# Patient Record
Sex: MALE | Race: WHITE
[De-identification: names, ages, dates, MRNs, and addresses within clinical notes are randomized per-mention and may not be internally consistent; named-entity substitution may affect disease eponyms.]

---

## 2018-10-21 ENCOUNTER — HOSPITAL ENCOUNTER (EMERGENCY)
Dept: HOSPITAL 94 - ER | Age: 83
Discharge: HOME | End: 2018-10-21
Payer: MEDICARE

## 2018-10-21 VITALS — WEIGHT: 231.49 LBS | HEIGHT: 68 IN | BODY MASS INDEX: 35.08 KG/M2

## 2018-10-21 VITALS — DIASTOLIC BLOOD PRESSURE: 65 MMHG | SYSTOLIC BLOOD PRESSURE: 133 MMHG
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
## 2018-10-21 DIAGNOSIS — Z98.890: ICD-10-CM

## 2018-10-21 DIAGNOSIS — Z60.2: ICD-10-CM

## 2018-10-21 DIAGNOSIS — M25.522: ICD-10-CM

## 2018-10-21 DIAGNOSIS — S90.415A: Primary | ICD-10-CM

## 2018-10-21 DIAGNOSIS — Z85.038: ICD-10-CM
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
## 2018-10-21 DIAGNOSIS — M79.632: ICD-10-CM

## 2018-10-21 DIAGNOSIS — R60.0: ICD-10-CM
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
## 2018-10-21 DIAGNOSIS — G89.29: ICD-10-CM

## 2018-10-21 PROCEDURE — 73080 X-RAY EXAM OF ELBOW: CPT

## 2018-10-21 PROCEDURE — 73660 X-RAY EXAM OF TOE(S): CPT

## 2018-10-21 PROCEDURE — 99284 EMERGENCY DEPT VISIT MOD MDM: CPT

## 2018-10-21 SDOH — SOCIAL STABILITY - SOCIAL INSECURITY: PROBLEMS RELATED TO LIVING ALONE: Z60.2

## 2022-02-25 ENCOUNTER — HOSPITAL ENCOUNTER (EMERGENCY)
Dept: HOSPITAL 94 - ER | Age: 87
Discharge: HOME | End: 2022-02-25
Payer: OTHER GOVERNMENT

## 2022-02-25 VITALS — HEIGHT: 68 IN | WEIGHT: 240.5 LBS | BODY MASS INDEX: 36.45 KG/M2

## 2022-02-25 VITALS — DIASTOLIC BLOOD PRESSURE: 74 MMHG | SYSTOLIC BLOOD PRESSURE: 120 MMHG

## 2022-02-25 DIAGNOSIS — Z72.89: ICD-10-CM

## 2022-02-25 DIAGNOSIS — R42: ICD-10-CM

## 2022-02-25 DIAGNOSIS — Z98.890: ICD-10-CM

## 2022-02-25 DIAGNOSIS — Z60.2: ICD-10-CM

## 2022-02-25 DIAGNOSIS — F10.129: Primary | ICD-10-CM

## 2022-02-25 DIAGNOSIS — Z85.038: ICD-10-CM
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
## 2022-02-25 DIAGNOSIS — R41.0: ICD-10-CM

## 2022-02-25 DIAGNOSIS — G89.29: ICD-10-CM

## 2022-02-25 LAB
ALBUMIN SERPL BCP-MCNC: 3.3 G/DL (ref 3.4–5)
ALBUMIN/GLOB SERPL: 1.1 {RATIO} (ref 1.1–1.5)
ALP SERPL-CCNC: 237 IU/L (ref 46–116)
ALT SERPL W P-5'-P-CCNC: 15 U/L (ref 12–78)
AMMONIA PLAS-SCNC: < 10 UMOL/L (ref 11–32)
AMPHETAMINES UR QL SCN: NEGATIVE
ANION GAP SERPL CALCULATED.3IONS-SCNC: 11 MMOL/L (ref 8–16)
AST SERPL W P-5'-P-CCNC: 23 U/L (ref 10–37)
BACTERIA URNS QL MICRO: (no result) /HPF
BARBITURATES UR QL SCN: NEGATIVE
BASOPHILS # BLD AUTO: 0 X10'3 (ref 0–0.2)
BASOPHILS NFR BLD AUTO: 0.7 % (ref 0–1)
BENZODIAZ UR QL SCN: NEGATIVE
BILIRUB SERPL-MCNC: 0.5 MG/DL (ref 0.1–1)
BUN SERPL-MCNC: 9 MG/DL (ref 7–18)
BUN/CREAT SERPL: 11.8 (ref 5.4–32)
BZE UR QL SCN: NEGATIVE
CALCIUM SERPL-MCNC: 8.3 MG/DL (ref 8.5–10.1)
CANNABINOIDS UR QL SCN: NEGATIVE
CHLORIDE SERPL-SCNC: 109 MMOL/L (ref 99–107)
CLARITY UR: CLEAR
CO2 SERPL-SCNC: 24.5 MMOL/L (ref 24–32)
COLOR UR: YELLOW
CREAT SERPL-MCNC: 0.76 MG/DL (ref 0.6–1.1)
DEPRECATED SQUAMOUS URNS QL MICRO: (no result) /LPF
EOSINOPHIL # BLD AUTO: 0.3 X10'3 (ref 0–0.9)
EOSINOPHIL NFR BLD AUTO: 4.1 % (ref 0–6)
ERYTHROCYTE [DISTWIDTH] IN BLOOD BY AUTOMATED COUNT: 15.8 % (ref 11.5–14.5)
ETHANOL SERPL-MCNC: 0.16 GM/DL (ref 0–0.01)
GFR SERPL CREATININE-BSD FRML MDRD: > 90 ML/MIN
GLUCOSE SERPL-MCNC: 93 MG/DL (ref 70–104)
GLUCOSE UR STRIP-MCNC: NEGATIVE MG/DL
HCT VFR BLD AUTO: 41.8 % (ref 42–52)
HGB BLD-MCNC: 13.8 G/DL (ref 14–17.9)
HGB UR QL STRIP: (no result)
KETONES UR STRIP-MCNC: NEGATIVE MG/DL
LACTATE PLASV-SCNC: 2 MMOL/L (ref 0.4–2)
LEUKOCYTE ESTERASE UR QL STRIP: NEGATIVE
LYMPHOCYTES # BLD AUTO: 2.9 X10'3 (ref 1.1–4.8)
LYMPHOCYTES NFR BLD AUTO: 43.4 % (ref 21–51)
MCH RBC QN AUTO: 31.5 PG (ref 27–31)
MCHC RBC AUTO-ENTMCNC: 33 G/DL (ref 33–36.5)
MCV RBC AUTO: 95.4 FL (ref 78–98)
METHADONE UR QL SCN: NEGATIVE
MONOCYTES # BLD AUTO: 0.5 X10'3 (ref 0–0.9)
MONOCYTES NFR BLD AUTO: 7.8 % (ref 2–12)
MUCOUS THREADS URNS QL MICRO: (no result) /LPF
NEUTROPHILS # BLD AUTO: 3 X10'3 (ref 1.8–7.7)
NEUTROPHILS NFR BLD AUTO: 44 % (ref 42–75)
NITRITE UR QL STRIP: NEGATIVE
OPIATES UR QL SCN: NEGATIVE
PCP UR QL SCN: NEGATIVE
PH UR STRIP: 6 [PH] (ref 4.8–8)
PLATELET # BLD AUTO: 151 X10'3 (ref 140–440)
PMV BLD AUTO: 9.1 FL (ref 7.4–10.4)
POTASSIUM SERPL-SCNC: 3.5 MMOL/L (ref 3.5–5.1)
PROT SERPL-MCNC: 6.2 G/DL (ref 6.4–8.2)
PROT UR QL STRIP: NEGATIVE MG/DL
RBC # BLD AUTO: 4.38 X10'6 (ref 4.7–6.1)
RBC #/AREA URNS HPF: (no result) /HPF (ref 0–2)
SODIUM SERPL-SCNC: 144 MMOL/L (ref 135–145)
SP GR UR STRIP: <=1.005 (ref 1–1.03)
URN COLLECT METHOD CLASS: (no result)
UROBILINOGEN UR STRIP-MCNC: 0.2 E.U/DL (ref 0.2–1)
WBC # BLD AUTO: 6.7 X10'3 (ref 4.5–11)
WBC #/AREA URNS HPF: (no result) /HPF (ref 0–4)

## 2022-02-25 PROCEDURE — 80320 DRUG SCREEN QUANTALCOHOLS: CPT

## 2022-02-25 PROCEDURE — 83605 ASSAY OF LACTIC ACID: CPT

## 2022-02-25 PROCEDURE — 71045 X-RAY EXAM CHEST 1 VIEW: CPT

## 2022-02-25 PROCEDURE — 87040 BLOOD CULTURE FOR BACTERIA: CPT

## 2022-02-25 PROCEDURE — 82140 ASSAY OF AMMONIA: CPT

## 2022-02-25 PROCEDURE — 80305 DRUG TEST PRSMV DIR OPT OBS: CPT

## 2022-02-25 PROCEDURE — 84484 ASSAY OF TROPONIN QUANT: CPT

## 2022-02-25 PROCEDURE — 80053 COMPREHEN METABOLIC PANEL: CPT

## 2022-02-25 PROCEDURE — 84443 ASSAY THYROID STIM HORMONE: CPT

## 2022-02-25 PROCEDURE — 85025 COMPLETE CBC W/AUTO DIFF WBC: CPT

## 2022-02-25 PROCEDURE — 99285 EMERGENCY DEPT VISIT HI MDM: CPT

## 2022-02-25 PROCEDURE — 81001 URINALYSIS AUTO W/SCOPE: CPT

## 2022-02-25 PROCEDURE — 36415 COLL VENOUS BLD VENIPUNCTURE: CPT

## 2022-02-25 PROCEDURE — 70450 CT HEAD/BRAIN W/O DYE: CPT

## 2022-02-25 PROCEDURE — 93005 ELECTROCARDIOGRAM TRACING: CPT

## 2022-02-25 SDOH — SOCIAL STABILITY - SOCIAL INSECURITY: PROBLEMS RELATED TO LIVING ALONE: Z60.2

## 2022-04-21 ENCOUNTER — HOSPITAL ENCOUNTER (EMERGENCY)
Dept: HOSPITAL 94 - ER | Age: 87
Discharge: HOME | End: 2022-04-21
Payer: OTHER GOVERNMENT

## 2022-04-21 VITALS — BODY MASS INDEX: 36.45 KG/M2 | WEIGHT: 240.5 LBS | HEIGHT: 68 IN

## 2022-04-21 VITALS — DIASTOLIC BLOOD PRESSURE: 93 MMHG | SYSTOLIC BLOOD PRESSURE: 154 MMHG

## 2022-04-21 DIAGNOSIS — R11.0: Primary | ICD-10-CM

## 2022-04-21 DIAGNOSIS — Z86.73: ICD-10-CM

## 2022-04-21 DIAGNOSIS — Z60.2: ICD-10-CM

## 2022-04-21 DIAGNOSIS — I48.91: ICD-10-CM

## 2022-04-21 DIAGNOSIS — Z72.89: ICD-10-CM

## 2022-04-21 DIAGNOSIS — Z98.890: ICD-10-CM

## 2022-04-21 DIAGNOSIS — Z85.038: ICD-10-CM

## 2022-04-21 DIAGNOSIS — I10: ICD-10-CM

## 2022-04-21 DIAGNOSIS — G89.29: ICD-10-CM

## 2022-04-21 LAB
ALBUMIN SERPL BCP-MCNC: 3.7 G/DL (ref 3.4–5)
ALBUMIN/GLOB SERPL: 1.4 {RATIO} (ref 1.1–1.5)
ALP SERPL-CCNC: 232 IU/L (ref 46–116)
ALT SERPL W P-5'-P-CCNC: 15 U/L (ref 12–78)
ANION GAP SERPL CALCULATED.3IONS-SCNC: 10 MMOL/L (ref 8–16)
AST SERPL W P-5'-P-CCNC: 26 U/L (ref 10–37)
BASOPHILS # BLD AUTO: 0 X10'3 (ref 0–0.2)
BASOPHILS NFR BLD AUTO: 0.5 % (ref 0–1)
BILIRUB SERPL-MCNC: 0.9 MG/DL (ref 0.1–1)
BUN SERPL-MCNC: 17 MG/DL (ref 7–18)
BUN/CREAT SERPL: 18.3 (ref 5.4–32)
CALCIUM SERPL-MCNC: 8.7 MG/DL (ref 8.5–10.1)
CHLORIDE SERPL-SCNC: 105 MMOL/L (ref 99–107)
CO2 SERPL-SCNC: 24.2 MMOL/L (ref 24–32)
CREAT SERPL-MCNC: 0.93 MG/DL (ref 0.6–1.1)
EOSINOPHIL # BLD AUTO: 0.1 X10'3 (ref 0–0.9)
EOSINOPHIL NFR BLD AUTO: 1.2 % (ref 0–6)
ERYTHROCYTE [DISTWIDTH] IN BLOOD BY AUTOMATED COUNT: 14.5 % (ref 11.5–14.5)
GFR SERPL CREATININE-BSD FRML MDRD: 77 ML/MIN
GLUCOSE SERPL-MCNC: 103 MG/DL (ref 70–104)
HCT VFR BLD AUTO: 38.3 % (ref 42–52)
HGB BLD-MCNC: 12.7 G/DL (ref 14–17.9)
LYMPHOCYTES # BLD AUTO: 1.1 X10'3 (ref 1.1–4.8)
LYMPHOCYTES NFR BLD AUTO: 15.1 % (ref 21–51)
MCH RBC QN AUTO: 31.4 PG (ref 27–31)
MCHC RBC AUTO-ENTMCNC: 33.1 G/DL (ref 33–36.5)
MCV RBC AUTO: 94.7 FL (ref 78–98)
MONOCYTES # BLD AUTO: 0.5 X10'3 (ref 0–0.9)
MONOCYTES NFR BLD AUTO: 7 % (ref 2–12)
NEUTROPHILS # BLD AUTO: 5.5 X10'3 (ref 1.8–7.7)
NEUTROPHILS NFR BLD AUTO: 76.2 % (ref 42–75)
PLATELET # BLD AUTO: 131 X10'3 (ref 140–440)
PMV BLD AUTO: 8.7 FL (ref 7.4–10.4)
POTASSIUM SERPL-SCNC: 3.9 MMOL/L (ref 3.5–5.1)
PROT SERPL-MCNC: 6.3 G/DL (ref 6.4–8.2)
RBC # BLD AUTO: 4.05 X10'6 (ref 4.7–6.1)
SODIUM SERPL-SCNC: 139 MMOL/L (ref 135–145)
WBC # BLD AUTO: 7.2 X10'3 (ref 4.5–11)

## 2022-04-21 PROCEDURE — 93005 ELECTROCARDIOGRAM TRACING: CPT

## 2022-04-21 PROCEDURE — 36415 COLL VENOUS BLD VENIPUNCTURE: CPT

## 2022-04-21 PROCEDURE — 84484 ASSAY OF TROPONIN QUANT: CPT

## 2022-04-21 PROCEDURE — 85025 COMPLETE CBC W/AUTO DIFF WBC: CPT

## 2022-04-21 PROCEDURE — 80053 COMPREHEN METABOLIC PANEL: CPT

## 2022-04-21 PROCEDURE — 71045 X-RAY EXAM CHEST 1 VIEW: CPT

## 2022-04-21 PROCEDURE — 99285 EMERGENCY DEPT VISIT HI MDM: CPT

## 2022-04-21 PROCEDURE — 83880 ASSAY OF NATRIURETIC PEPTIDE: CPT

## 2022-04-21 SDOH — SOCIAL STABILITY - SOCIAL INSECURITY: PROBLEMS RELATED TO LIVING ALONE: Z60.2

## 2022-05-28 ENCOUNTER — HOSPITAL ENCOUNTER (INPATIENT)
Dept: HOSPITAL 94 - ER | Age: 87
LOS: 2 days | Discharge: HOME | DRG: 69 | End: 2022-05-30
Attending: INTERNAL MEDICINE | Admitting: INTERNAL MEDICINE
Payer: OTHER GOVERNMENT

## 2022-05-28 VITALS — BODY MASS INDEX: 38.25 KG/M2 | WEIGHT: 267.2 LBS | HEIGHT: 70 IN

## 2022-05-28 VITALS — SYSTOLIC BLOOD PRESSURE: 183 MMHG | DIASTOLIC BLOOD PRESSURE: 83 MMHG
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
## 2022-05-28 DIAGNOSIS — G45.0: Primary | ICD-10-CM

## 2022-05-28 DIAGNOSIS — I48.91: ICD-10-CM
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
## 2022-05-28 DIAGNOSIS — G62.9: ICD-10-CM

## 2022-05-28 DIAGNOSIS — Z86.73: ICD-10-CM

## 2022-05-28 DIAGNOSIS — I10: ICD-10-CM

## 2022-05-28 DIAGNOSIS — G89.29: ICD-10-CM
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
## 2022-05-28 DIAGNOSIS — Z60.2: ICD-10-CM

## 2022-05-28 DIAGNOSIS — I25.10: ICD-10-CM

## 2022-05-28 DIAGNOSIS — G70.00: ICD-10-CM

## 2022-05-28 DIAGNOSIS — Z22.322: ICD-10-CM

## 2022-05-28 DIAGNOSIS — Z85.038: ICD-10-CM

## 2022-05-28 DIAGNOSIS — N40.0: ICD-10-CM

## 2022-05-28 LAB
ALBUMIN SERPL BCP-MCNC: 3.6 G/DL (ref 3.4–5)
ALBUMIN/GLOB SERPL: 1.2 {RATIO} (ref 1.1–1.5)
ALP SERPL-CCNC: 217 IU/L (ref 46–116)
ALT SERPL W P-5'-P-CCNC: 14 U/L (ref 12–78)
ANION GAP SERPL CALCULATED.3IONS-SCNC: 7 MMOL/L (ref 8–16)
APTT PPP: 34 SECONDS (ref 22–32)
AST SERPL W P-5'-P-CCNC: 27 U/L (ref 10–37)
BASOPHILS # BLD AUTO: 0 X10'3 (ref 0–0.2)
BASOPHILS NFR BLD AUTO: 0.4 % (ref 0–1)
BILIRUB SERPL-MCNC: 0.9 MG/DL (ref 0.1–1)
BUN SERPL-MCNC: 11 MG/DL (ref 7–18)
BUN/CREAT SERPL: 12.2 (ref 5.4–32)
CALCIUM SERPL-MCNC: 8.3 MG/DL (ref 8.5–10.1)
CHLORIDE SERPL-SCNC: 108 MMOL/L (ref 99–107)
CHOLEST SERPL-MCNC: 162 MG/DL (ref 0–200)
CHOLEST/HDLC SERPL: 2.1 {RATIO} (ref 0–4.99)
CO2 SERPL-SCNC: 26.8 MMOL/L (ref 24–32)
CREAT SERPL-MCNC: 0.9 MG/DL (ref 0.6–1.1)
EOSINOPHIL # BLD AUTO: 0.2 X10'3 (ref 0–0.9)
EOSINOPHIL NFR BLD AUTO: 3.4 % (ref 0–6)
ERYTHROCYTE [DISTWIDTH] IN BLOOD BY AUTOMATED COUNT: 14.7 % (ref 11.5–14.5)
GFR SERPL CREATININE-BSD FRML MDRD: 80 ML/MIN
GLUCOSE SERPL-MCNC: 108 MG/DL (ref 70–104)
HBA1C MFR BLD: 5.3 % (ref 4.5–6.2)
HCT VFR BLD AUTO: 41.7 % (ref 42–52)
HDLC SERPL-MCNC: 79 MG/DL (ref 35–60)
HGB BLD-MCNC: 13.6 G/DL (ref 14–17.9)
LDLC SERPL DIRECT ASSAY-MCNC: 64 MG/DL (ref 50–100)
LYMPHOCYTES # BLD AUTO: 1.9 X10'3 (ref 1.1–4.8)
LYMPHOCYTES NFR BLD AUTO: 26.6 % (ref 21–51)
MAGNESIUM SERPL-MCNC: 2.1 MG/DL (ref 1.5–2.4)
MCH RBC QN AUTO: 31.3 PG (ref 27–31)
MCHC RBC AUTO-ENTMCNC: 32.7 G/DL (ref 33–36.5)
MCV RBC AUTO: 95.6 FL (ref 78–98)
MONOCYTES # BLD AUTO: 0.5 X10'3 (ref 0–0.9)
MONOCYTES NFR BLD AUTO: 7.1 % (ref 2–12)
NEUTROPHILS # BLD AUTO: 4.4 X10'3 (ref 1.8–7.7)
NEUTROPHILS NFR BLD AUTO: 62.5 % (ref 42–75)
PLATELET # BLD AUTO: 148 X10'3 (ref 140–440)
PMV BLD AUTO: 8.9 FL (ref 7.4–10.4)
POTASSIUM SERPL-SCNC: 3.9 MMOL/L (ref 3.5–5.1)
PROT SERPL-MCNC: 6.5 G/DL (ref 6.4–8.2)
RBC # BLD AUTO: 4.36 X10'6 (ref 4.7–6.1)
SODIUM SERPL-SCNC: 142 MMOL/L (ref 135–145)
TRIGL SERPL-MCNC: 109 MG/DL (ref 20–135)
WBC # BLD AUTO: 7 X10'3 (ref 4.5–11)

## 2022-05-28 PROCEDURE — 93306 TTE W/DOPPLER COMPLETE: CPT

## 2022-05-28 PROCEDURE — 97116 GAIT TRAINING THERAPY: CPT

## 2022-05-28 PROCEDURE — 87081 CULTURE SCREEN ONLY: CPT

## 2022-05-28 PROCEDURE — 80053 COMPREHEN METABOLIC PANEL: CPT

## 2022-05-28 PROCEDURE — B3281ZZ COMPUTERIZED TOMOGRAPHY (CT SCAN) OF BILATERAL INTERNAL CAROTID ARTERIES USING LOW OSMOLAR CONTRAST: ICD-10-PCS

## 2022-05-28 PROCEDURE — 71045 X-RAY EXAM CHEST 1 VIEW: CPT

## 2022-05-28 PROCEDURE — 99291 CRITICAL CARE FIRST HOUR: CPT

## 2022-05-28 PROCEDURE — 80048 BASIC METABOLIC PNL TOTAL CA: CPT

## 2022-05-28 PROCEDURE — 80061 LIPID PANEL: CPT

## 2022-05-28 PROCEDURE — 83735 ASSAY OF MAGNESIUM: CPT

## 2022-05-28 PROCEDURE — 36415 COLL VENOUS BLD VENIPUNCTURE: CPT

## 2022-05-28 PROCEDURE — B32G1ZZ COMPUTERIZED TOMOGRAPHY (CT SCAN) OF BILATERAL VERTEBRAL ARTERIES USING LOW OSMOLAR CONTRAST: ICD-10-PCS

## 2022-05-28 PROCEDURE — 83036 HEMOGLOBIN GLYCOSYLATED A1C: CPT

## 2022-05-28 PROCEDURE — 93880 EXTRACRANIAL BILAT STUDY: CPT

## 2022-05-28 PROCEDURE — B32R1ZZ COMPUTERIZED TOMOGRAPHY (CT SCAN) OF INTRACRANIAL ARTERIES USING LOW OSMOLAR CONTRAST: ICD-10-PCS

## 2022-05-28 PROCEDURE — 85730 THROMBOPLASTIN TIME PARTIAL: CPT

## 2022-05-28 PROCEDURE — B3251ZZ COMPUTERIZED TOMOGRAPHY (CT SCAN) OF BILATERAL COMMON CAROTID ARTERIES USING LOW OSMOLAR CONTRAST: ICD-10-PCS

## 2022-05-28 PROCEDURE — 85025 COMPLETE CBC W/AUTO DIFF WBC: CPT

## 2022-05-28 PROCEDURE — 70450 CT HEAD/BRAIN W/O DYE: CPT

## 2022-05-28 PROCEDURE — 97161 PT EVAL LOW COMPLEX 20 MIN: CPT

## 2022-05-28 PROCEDURE — 85610 PROTHROMBIN TIME: CPT

## 2022-05-28 PROCEDURE — 73630 X-RAY EXAM OF FOOT: CPT

## 2022-05-28 RX ADMIN — OXYCODONE PRN MG: 5 TABLET ORAL at 22:40

## 2022-05-28 RX ADMIN — Medication SCH TAB: at 17:10

## 2022-05-28 SDOH — SOCIAL STABILITY - SOCIAL INSECURITY: PROBLEMS RELATED TO LIVING ALONE: Z60.2

## 2022-05-28 NOTE — NUR
CALLED DR SAINI CONCERNING A BRUISED TOE ON PATIENT'S LEFT TOE. PT BELIEVES 
HE "PROBABLY BROKE IT TWO DAYS AGO". REQUESTED IMAGING. DR VALERY CORNELIUS'D XRAY OF 
LEFT FOOT. 



ALSO SPOKE TO HIM CONCERNING A DIET ORDER FOR PT. PT PASSED A SWALLOW TEST BY 
STROKE RN. GIVEN A TELEPHONE ORDER FOR A HEART HEALTHY DIET.

## 2022-05-29 VITALS — DIASTOLIC BLOOD PRESSURE: 71 MMHG | SYSTOLIC BLOOD PRESSURE: 119 MMHG

## 2022-05-29 VITALS — SYSTOLIC BLOOD PRESSURE: 166 MMHG | DIASTOLIC BLOOD PRESSURE: 83 MMHG

## 2022-05-29 VITALS — DIASTOLIC BLOOD PRESSURE: 91 MMHG | SYSTOLIC BLOOD PRESSURE: 134 MMHG

## 2022-05-29 VITALS — DIASTOLIC BLOOD PRESSURE: 70 MMHG | SYSTOLIC BLOOD PRESSURE: 178 MMHG

## 2022-05-29 VITALS — DIASTOLIC BLOOD PRESSURE: 74 MMHG | SYSTOLIC BLOOD PRESSURE: 142 MMHG

## 2022-05-29 VITALS — DIASTOLIC BLOOD PRESSURE: 65 MMHG | SYSTOLIC BLOOD PRESSURE: 131 MMHG

## 2022-05-29 VITALS — DIASTOLIC BLOOD PRESSURE: 69 MMHG | SYSTOLIC BLOOD PRESSURE: 148 MMHG

## 2022-05-29 LAB
ALBUMIN SERPL BCP-MCNC: 3.6 G/DL (ref 3.4–5)
ANION GAP SERPL CALCULATED.3IONS-SCNC: 6 MMOL/L (ref 8–16)
BASOPHILS # BLD AUTO: 0 X10'3 (ref 0–0.2)
BASOPHILS NFR BLD AUTO: 0.5 % (ref 0–1)
BUN SERPL-MCNC: 9 MG/DL (ref 7–18)
BUN/CREAT SERPL: 11.7 (ref 5.4–32)
CALCIUM SERPL-MCNC: 8.7 MG/DL (ref 8.5–10.1)
CHLORIDE SERPL-SCNC: 110 MMOL/L (ref 99–107)
CO2 SERPL-SCNC: 25.9 MMOL/L (ref 24–32)
CREAT SERPL-MCNC: 0.77 MG/DL (ref 0.6–1.1)
EOSINOPHIL # BLD AUTO: 0.2 X10'3 (ref 0–0.9)
EOSINOPHIL NFR BLD AUTO: 3.4 % (ref 0–6)
ERYTHROCYTE [DISTWIDTH] IN BLOOD BY AUTOMATED COUNT: 14.6 % (ref 11.5–14.5)
GFR SERPL CREATININE-BSD FRML MDRD: > 90 ML/MIN
GLUCOSE SERPL-MCNC: 83 MG/DL (ref 70–104)
HCT VFR BLD AUTO: 41.2 % (ref 42–52)
HGB BLD-MCNC: 13.7 G/DL (ref 14–17.9)
LYMPHOCYTES # BLD AUTO: 1.2 X10'3 (ref 1.1–4.8)
LYMPHOCYTES NFR BLD AUTO: 21 % (ref 21–51)
MAGNESIUM SERPL-MCNC: 2.2 MG/DL (ref 1.5–2.4)
MCH RBC QN AUTO: 32 PG (ref 27–31)
MCHC RBC AUTO-ENTMCNC: 33.3 G/DL (ref 33–36.5)
MCV RBC AUTO: 96.1 FL (ref 78–98)
MONOCYTES # BLD AUTO: 0.4 X10'3 (ref 0–0.9)
MONOCYTES NFR BLD AUTO: 7.3 % (ref 2–12)
NEUTROPHILS # BLD AUTO: 4 X10'3 (ref 1.8–7.7)
NEUTROPHILS NFR BLD AUTO: 67.8 % (ref 42–75)
PLATELET # BLD AUTO: 136 X10'3 (ref 140–440)
PMV BLD AUTO: 8.9 FL (ref 7.4–10.4)
POTASSIUM SERPL-SCNC: 4 MMOL/L (ref 3.5–5.1)
RBC # BLD AUTO: 4.29 X10'6 (ref 4.7–6.1)
SODIUM SERPL-SCNC: 142 MMOL/L (ref 135–145)
WBC # BLD AUTO: 5.9 X10'3 (ref 4.5–11)

## 2022-05-29 RX ADMIN — OXYCODONE PRN MG: 5 TABLET ORAL at 06:09

## 2022-05-29 RX ADMIN — OXYCODONE PRN MG: 5 TABLET ORAL at 19:21

## 2022-05-29 RX ADMIN — OXYCODONE PRN MG: 5 TABLET ORAL at 19:25

## 2022-05-29 RX ADMIN — Medication SCH TAB: at 10:05

## 2022-05-29 NOTE — NUR
Patient in room PCU 3026. I have received report from Shari BAILON and had the opportunity to 
ask questions and assume patient care.

## 2022-05-29 NOTE — NUR
Tried calling [facility] where patient resides to obtain info on pacemaker. Transferred from SNF to 
Assisted living without anyone assisting me. Will pass on need for Pacemaker make and model 
information to NOC RN.

## 2022-05-29 NOTE — NUR
Problems reprioritized. Patient report given, questions answered & plan of care reviewed 
with Patricia BAILON.

## 2022-05-30 VITALS — DIASTOLIC BLOOD PRESSURE: 71 MMHG | SYSTOLIC BLOOD PRESSURE: 154 MMHG

## 2022-05-30 VITALS — SYSTOLIC BLOOD PRESSURE: 139 MMHG | DIASTOLIC BLOOD PRESSURE: 85 MMHG

## 2022-05-30 LAB
ALBUMIN SERPL BCP-MCNC: 3.2 G/DL (ref 3.4–5)
ANION GAP SERPL CALCULATED.3IONS-SCNC: 8 MMOL/L (ref 8–16)
BASOPHILS # BLD AUTO: 0 X10'3 (ref 0–0.2)
BASOPHILS NFR BLD AUTO: 0.5 % (ref 0–1)
BUN SERPL-MCNC: 11 MG/DL (ref 7–18)
BUN/CREAT SERPL: 15.9 (ref 5.4–32)
CALCIUM SERPL-MCNC: 8.7 MG/DL (ref 8.5–10.1)
CHLORIDE SERPL-SCNC: 108 MMOL/L (ref 99–107)
CO2 SERPL-SCNC: 26.5 MMOL/L (ref 24–32)
CREAT SERPL-MCNC: 0.69 MG/DL (ref 0.6–1.1)
EOSINOPHIL # BLD AUTO: 0.2 X10'3 (ref 0–0.9)
EOSINOPHIL NFR BLD AUTO: 3.2 % (ref 0–6)
ERYTHROCYTE [DISTWIDTH] IN BLOOD BY AUTOMATED COUNT: 14.5 % (ref 11.5–14.5)
GFR SERPL CREATININE-BSD FRML MDRD: > 90 ML/MIN
GLUCOSE SERPL-MCNC: 101 MG/DL (ref 70–104)
HCT VFR BLD AUTO: 38.1 % (ref 42–52)
HGB BLD-MCNC: 12.7 G/DL (ref 14–17.9)
LYMPHOCYTES # BLD AUTO: 1.2 X10'3 (ref 1.1–4.8)
LYMPHOCYTES NFR BLD AUTO: 21.6 % (ref 21–51)
MAGNESIUM SERPL-MCNC: 2.1 MG/DL (ref 1.5–2.4)
MCH RBC QN AUTO: 31.7 PG (ref 27–31)
MCHC RBC AUTO-ENTMCNC: 33.3 G/DL (ref 33–36.5)
MCV RBC AUTO: 95.1 FL (ref 78–98)
MONOCYTES # BLD AUTO: 0.5 X10'3 (ref 0–0.9)
MONOCYTES NFR BLD AUTO: 8.8 % (ref 2–12)
NEUTROPHILS # BLD AUTO: 3.6 X10'3 (ref 1.8–7.7)
NEUTROPHILS NFR BLD AUTO: 65.9 % (ref 42–75)
PLATELET # BLD AUTO: 127 X10'3 (ref 140–440)
PMV BLD AUTO: 9.1 FL (ref 7.4–10.4)
POTASSIUM SERPL-SCNC: 3.5 MMOL/L (ref 3.5–5.1)
RBC # BLD AUTO: 4.01 X10'6 (ref 4.7–6.1)
SODIUM SERPL-SCNC: 142 MMOL/L (ref 135–145)
WBC # BLD AUTO: 5.4 X10'3 (ref 4.5–11)

## 2022-05-30 RX ADMIN — OXYCODONE PRN MG: 5 TABLET ORAL at 01:29

## 2022-05-30 RX ADMIN — Medication SCH TAB: at 07:31

## 2022-05-30 RX ADMIN — OXYCODONE PRN MG: 5 TABLET ORAL at 07:31

## 2022-05-30 NOTE — NUR
Patient in room PCU 3026. I have received report from   Patricia BAILON and had the opportunity 
to ask questions and assume patient care.

## 2022-05-30 NOTE — NUR
patient up and about in room with walker. very anxious to go home as stated by patient. Dr Behl into see patient. [facility] is to [facility]. [facility] DC instructions given to patient 
and son Heather. No new meds but meds clarified with patient who states he has them all 
organized at home. Patient DC home via private  car with son heather in stable condition.

## 2022-05-30 NOTE — NUR
ASSISTING RN WITH MED REC, CALL [facility] FOR UPDATED MED REC, THEY WILL FAX TO 
PCU, RN AWARE

## 2022-05-30 NOTE — NUR
Problems reprioritized. Patient report given, questions answered & plan of care reviewed 
with ADAMARIS Pruitt.

## 2024-08-10 ENCOUNTER — HOSPITAL ENCOUNTER (INPATIENT)
Dept: HOSPITAL 94 - ER | Age: 89
LOS: 2 days | Discharge: HOME | DRG: 291 | End: 2024-08-12
Attending: INTERNAL MEDICINE | Admitting: INTERNAL MEDICINE
Payer: OTHER GOVERNMENT

## 2024-08-10 VITALS
TEMPERATURE: 97.3 F | HEART RATE: 80 BPM | OXYGEN SATURATION: 95 % | DIASTOLIC BLOOD PRESSURE: 81 MMHG | SYSTOLIC BLOOD PRESSURE: 144 MMHG

## 2024-08-10 VITALS — WEIGHT: 271.17 LBS | BODY MASS INDEX: 41.1 KG/M2 | HEIGHT: 68 IN

## 2024-08-10 DIAGNOSIS — G89.29: ICD-10-CM

## 2024-08-10 DIAGNOSIS — I87.2: ICD-10-CM

## 2024-08-10 DIAGNOSIS — I25.10: ICD-10-CM

## 2024-08-10 DIAGNOSIS — M54.9: ICD-10-CM

## 2024-08-10 DIAGNOSIS — R39.15: ICD-10-CM

## 2024-08-10 DIAGNOSIS — L03.116: ICD-10-CM

## 2024-08-10 DIAGNOSIS — Z95.0: ICD-10-CM

## 2024-08-10 DIAGNOSIS — Z85.038: ICD-10-CM

## 2024-08-10 DIAGNOSIS — Z66: ICD-10-CM

## 2024-08-10 DIAGNOSIS — I48.91: ICD-10-CM

## 2024-08-10 DIAGNOSIS — Z86.73: ICD-10-CM

## 2024-08-10 DIAGNOSIS — Z79.899: ICD-10-CM

## 2024-08-10 DIAGNOSIS — G70.00: ICD-10-CM

## 2024-08-10 DIAGNOSIS — G62.9: ICD-10-CM

## 2024-08-10 DIAGNOSIS — I11.0: Primary | ICD-10-CM

## 2024-08-10 DIAGNOSIS — N40.1: ICD-10-CM

## 2024-08-10 DIAGNOSIS — I50.23: ICD-10-CM

## 2024-08-10 LAB
ALBUMIN SERPL BCP-MCNC: 3.2 G/DL (ref 3.4–5)
ANION GAP SERPL CALCULATED.3IONS-SCNC: 4 MMOL/L (ref 8–16)
BASOPHILS # BLD AUTO: 0 X10'3 (ref 0–0.2)
BASOPHILS NFR BLD AUTO: 0.6 % (ref 0–1)
BUN SERPL-MCNC: 17 MG/DL (ref 7–18)
BUN/CREAT SERPL: 19.3 (ref 10–20)
CALCIUM SERPL-MCNC: 8.8 MG/DL (ref 8.5–10.1)
CHLORIDE SERPL-SCNC: 109 MMOL/L (ref 99–107)
CO2 SERPL-SCNC: 28.4 MMOL/L (ref 24–32)
CREAT CL PREDICTED SERPL C-G-VRATE: 56 ML/MIN
CREAT SERPL-MCNC: 0.88 MG/DL (ref 0.6–1.1)
EOSINOPHIL # BLD AUTO: 0.1 X10'3 (ref 0–0.9)
EOSINOPHIL NFR BLD AUTO: 1.7 % (ref 0–6)
ERYTHROCYTE [DISTWIDTH] IN BLOOD BY AUTOMATED COUNT: 15 % (ref 11.5–14.5)
GFR SERPL CREATININE-BSD FRML MDRD: 82 ML/MIN
GLUCOSE SERPL-MCNC: 96 MG/DL (ref 70–104)
HCT VFR BLD AUTO: 39 % (ref 42–52)
HGB BLD-MCNC: 12.8 G/DL (ref 14–17.9)
LYMPHOCYTES # BLD AUTO: 1.2 X10'3 (ref 1.1–4.8)
LYMPHOCYTES NFR BLD AUTO: 22.6 % (ref 21–51)
MCH RBC QN AUTO: 31.8 PG (ref 27–31)
MCHC RBC AUTO-ENTMCNC: 32.8 G/DL (ref 33–36.5)
MCV RBC AUTO: 96.8 FL (ref 78–98)
MONOCYTES # BLD AUTO: 0.6 X10'3 (ref 0–0.9)
MONOCYTES NFR BLD AUTO: 10.9 % (ref 2–12)
NEUTROPHILS # BLD AUTO: 3.4 X10'3 (ref 1.8–7.7)
NEUTROPHILS NFR BLD AUTO: 64.2 % (ref 42–75)
NT-PROBNP SERPL-MCNC: 745 PG/ML (ref 0–450)
PLATELET # BLD AUTO: 139 X10'3 (ref 140–440)
PMV BLD AUTO: 8.9 FL (ref 7.4–10.4)
POTASSIUM SERPL-SCNC: 4.1 MMOL/L (ref 3.5–5.1)
RBC # BLD AUTO: 4.03 X10'6 (ref 4.7–6.1)
SODIUM SERPL-SCNC: 141 MMOL/L (ref 135–145)
WBC # BLD AUTO: 5.2 X10'3 (ref 4.5–11)

## 2024-08-10 PROCEDURE — 97161 PT EVAL LOW COMPLEX 20 MIN: CPT

## 2024-08-10 PROCEDURE — 93306 TTE W/DOPPLER COMPLETE: CPT

## 2024-08-10 PROCEDURE — 71045 X-RAY EXAM CHEST 1 VIEW: CPT

## 2024-08-10 PROCEDURE — 36415 COLL VENOUS BLD VENIPUNCTURE: CPT

## 2024-08-10 PROCEDURE — 99285 EMERGENCY DEPT VISIT HI MDM: CPT

## 2024-08-10 PROCEDURE — 85007 BL SMEAR W/DIFF WBC COUNT: CPT

## 2024-08-10 PROCEDURE — 93970 EXTREMITY STUDY: CPT

## 2024-08-10 PROCEDURE — 84484 ASSAY OF TROPONIN QUANT: CPT

## 2024-08-10 PROCEDURE — 83880 ASSAY OF NATRIURETIC PEPTIDE: CPT

## 2024-08-10 PROCEDURE — 97530 THERAPEUTIC ACTIVITIES: CPT

## 2024-08-10 PROCEDURE — 80048 BASIC METABOLIC PNL TOTAL CA: CPT

## 2024-08-10 PROCEDURE — 85025 COMPLETE CBC W/AUTO DIFF WBC: CPT

## 2024-08-10 PROCEDURE — 85610 PROTHROMBIN TIME: CPT

## 2024-08-10 PROCEDURE — 80053 COMPREHEN METABOLIC PANEL: CPT

## 2024-08-10 PROCEDURE — 93005 ELECTROCARDIOGRAM TRACING: CPT

## 2024-08-10 PROCEDURE — 97116 GAIT TRAINING THERAPY: CPT

## 2024-08-10 PROCEDURE — 87081 CULTURE SCREEN ONLY: CPT

## 2024-08-10 RX ADMIN — SACUBITRIL AND VALSARTAN SCH TABLET: 24; 26 TABLET, FILM COATED ORAL at 21:47

## 2024-08-10 RX ADMIN — HEPARIN SODIUM SCH UNIT: 5000 INJECTION, SOLUTION INTRAVENOUS; SUBCUTANEOUS at 21:46

## 2024-08-10 RX ADMIN — HYDROCODONE BITARTRATE AND ACETAMINOPHEN PRN TAB: 5; 325 TABLET ORAL at 17:45

## 2024-08-11 VITALS
HEART RATE: 79 BPM | SYSTOLIC BLOOD PRESSURE: 155 MMHG | RESPIRATION RATE: 18 BRPM | TEMPERATURE: 98 F | OXYGEN SATURATION: 95 % | DIASTOLIC BLOOD PRESSURE: 86 MMHG

## 2024-08-11 VITALS
DIASTOLIC BLOOD PRESSURE: 81 MMHG | HEART RATE: 73 BPM | OXYGEN SATURATION: 98 % | RESPIRATION RATE: 16 BRPM | TEMPERATURE: 98.7 F | SYSTOLIC BLOOD PRESSURE: 142 MMHG

## 2024-08-11 VITALS — OXYGEN SATURATION: 97 % | RESPIRATION RATE: 16 BRPM

## 2024-08-11 VITALS — DIASTOLIC BLOOD PRESSURE: 65 MMHG | HEART RATE: 79 BPM | SYSTOLIC BLOOD PRESSURE: 106 MMHG

## 2024-08-11 VITALS
TEMPERATURE: 97.9 F | HEART RATE: 79 BPM | SYSTOLIC BLOOD PRESSURE: 138 MMHG | DIASTOLIC BLOOD PRESSURE: 75 MMHG | RESPIRATION RATE: 16 BRPM | OXYGEN SATURATION: 96 %

## 2024-08-11 VITALS
DIASTOLIC BLOOD PRESSURE: 68 MMHG | TEMPERATURE: 98.7 F | SYSTOLIC BLOOD PRESSURE: 113 MMHG | HEART RATE: 73 BPM | RESPIRATION RATE: 16 BRPM | OXYGEN SATURATION: 98 %

## 2024-08-11 VITALS
DIASTOLIC BLOOD PRESSURE: 77 MMHG | SYSTOLIC BLOOD PRESSURE: 139 MMHG | OXYGEN SATURATION: 96 % | TEMPERATURE: 97.8 F | HEART RATE: 75 BPM

## 2024-08-11 VITALS
TEMPERATURE: 98.6 F | SYSTOLIC BLOOD PRESSURE: 145 MMHG | OXYGEN SATURATION: 96 % | RESPIRATION RATE: 16 BRPM | DIASTOLIC BLOOD PRESSURE: 70 MMHG | HEART RATE: 75 BPM

## 2024-08-11 LAB
ALBUMIN SERPL BCP-MCNC: 3 G/DL (ref 3.4–5)
ALBUMIN/GLOB SERPL: 1.2 {RATIO} (ref 1.1–1.5)
ALP SERPL-CCNC: 144 IU/L (ref 46–116)
ALT SERPL W P-5'-P-CCNC: 16 U/L (ref 12–78)
ANION GAP SERPL CALCULATED.3IONS-SCNC: 5 MMOL/L (ref 8–16)
AST SERPL W P-5'-P-CCNC: 21 U/L (ref 10–37)
BASOPHILS # BLD AUTO: 0 X10'3 (ref 0–0.2)
BASOPHILS NFR BLD AUTO: 0.9 % (ref 0–1)
BILIRUB SERPL-MCNC: 1.1 MG/DL (ref 0.1–1)
BUN SERPL-MCNC: 14 MG/DL (ref 7–18)
BUN/CREAT SERPL: 21.2 (ref 10–20)
BURR CELLS BLD QL SMEAR: (no result)
CALCIUM SERPL-MCNC: 8.7 MG/DL (ref 8.5–10.1)
CHLORIDE SERPL-SCNC: 109 MMOL/L (ref 99–107)
CO2 SERPL-SCNC: 27 MMOL/L (ref 24–32)
CREAT CL PREDICTED SERPL C-G-VRATE: 75 ML/MIN
CREAT SERPL-MCNC: 0.66 MG/DL (ref 0.6–1.1)
EOSINOPHIL # BLD AUTO: 0.2 X10'3 (ref 0–0.9)
EOSINOPHIL NFR BLD AUTO: 3.6 % (ref 0–6)
ERYTHROCYTE [DISTWIDTH] IN BLOOD BY AUTOMATED COUNT: 14.7 % (ref 11.5–14.5)
GFR SERPL CREATININE-BSD FRML MDRD: > 90 ML/MIN
GLOBULIN SER CALC-MCNC: 2.6 G/DL (ref 2.7–4.3)
GLUCOSE SERPL-MCNC: 88 MG/DL (ref 70–104)
HCT VFR BLD AUTO: 37.1 % (ref 42–52)
HGB BLD-MCNC: 12.5 G/DL (ref 14–17.9)
LYMPHOCYTES # BLD AUTO: 1.7 X10'3 (ref 1.1–4.8)
LYMPHOCYTES NFR BLD AUTO: 34.3 % (ref 21–51)
LYMPHOCYTES NFR BLD MANUAL: 31 % (ref 21–51)
MCH RBC QN AUTO: 32.6 PG (ref 27–31)
MCHC RBC AUTO-ENTMCNC: 33.8 G/DL (ref 33–36.5)
MCV RBC AUTO: 96.4 FL (ref 78–98)
MONOCYTES # BLD AUTO: 0.5 X10'3 (ref 0–0.9)
MONOCYTES NFR BLD AUTO: 10.7 % (ref 2–12)
MONOCYTES NFR BLD MANUAL: 11 % (ref 2–12)
NEUTROPHILS # BLD AUTO: 2.5 X10'3 (ref 1.8–7.7)
NEUTROPHILS NFR BLD AUTO: 50.5 % (ref 42–75)
NEUTS SEG NFR BLD MANUAL: 58 % (ref 42–75)
NRBC BLD MANUAL-RTO: 1 /100WBC (ref 0–0)
OVALOCYTES BLD QL SMEAR: (no result)
PLATELET # BLD AUTO: 128 X10'3 (ref 140–440)
PLATELET BLD QL SMEAR: (no result)
PMV BLD AUTO: 9 FL (ref 7.4–10.4)
POTASSIUM SERPL-SCNC: 3.5 MMOL/L (ref 3.5–5.1)
PROT SERPL-MCNC: 5.6 G/DL (ref 6.4–8.2)
RBC # BLD AUTO: 3.85 X10'6 (ref 4.7–6.1)
RBC MORPH BLD: (no result)
SMUDGE CELLS BLD QL SMEAR: (no result)
SODIUM SERPL-SCNC: 141 MMOL/L (ref 135–145)
TOTAL CELLS COUNTED FLD: 100
WBC # BLD AUTO: 4.9 X10'3 (ref 4.5–11)

## 2024-08-11 RX ADMIN — EMPAGLIFLOZIN SCH MG: 10 TABLET, FILM COATED ORAL at 08:18

## 2024-08-11 RX ADMIN — HYDROCODONE BITARTRATE AND ACETAMINOPHEN PRN TAB: 5; 325 TABLET ORAL at 00:30

## 2024-08-12 VITALS
DIASTOLIC BLOOD PRESSURE: 73 MMHG | HEART RATE: 70 BPM | OXYGEN SATURATION: 94 % | TEMPERATURE: 97.1 F | SYSTOLIC BLOOD PRESSURE: 124 MMHG | RESPIRATION RATE: 16 BRPM

## 2024-08-12 VITALS
DIASTOLIC BLOOD PRESSURE: 73 MMHG | TEMPERATURE: 97.1 F | HEART RATE: 75 BPM | SYSTOLIC BLOOD PRESSURE: 144 MMHG | RESPIRATION RATE: 16 BRPM | OXYGEN SATURATION: 94 %

## 2024-08-12 VITALS
OXYGEN SATURATION: 94 % | TEMPERATURE: 97.1 F | RESPIRATION RATE: 16 BRPM | SYSTOLIC BLOOD PRESSURE: 144 MMHG | HEART RATE: 75 BPM | DIASTOLIC BLOOD PRESSURE: 73 MMHG

## 2024-08-12 VITALS
OXYGEN SATURATION: 95 % | RESPIRATION RATE: 16 BRPM | TEMPERATURE: 97.6 F | SYSTOLIC BLOOD PRESSURE: 133 MMHG | HEART RATE: 77 BPM | DIASTOLIC BLOOD PRESSURE: 79 MMHG

## 2024-08-12 VITALS — RESPIRATION RATE: 16 BRPM

## 2024-08-12 VITALS — OXYGEN SATURATION: 94 % | RESPIRATION RATE: 16 BRPM

## 2024-08-12 LAB
ALBUMIN SERPL BCP-MCNC: 3 G/DL (ref 3.4–5)
ALBUMIN/GLOB SERPL: 1.1 {RATIO} (ref 1.1–1.5)
ALP SERPL-CCNC: 143 IU/L (ref 46–116)
ALT SERPL W P-5'-P-CCNC: 21 U/L (ref 12–78)
ANION GAP SERPL CALCULATED.3IONS-SCNC: 6 MMOL/L (ref 8–16)
AST SERPL W P-5'-P-CCNC: 24 U/L (ref 10–37)
BASOPHILS # BLD AUTO: (no result) X10'3 (ref 0–1)
BASOPHILS NFR BLD AUTO: (no result) % (ref 0–2)
BASOPHILS NFR BLD MANUAL: 1 % (ref 0–1)
BILIRUB SERPL-MCNC: 1.1 MG/DL (ref 0.1–1)
BUN SERPL-MCNC: 16 MG/DL (ref 7–18)
BUN/CREAT SERPL: 21.3 (ref 10–20)
CALCIUM SERPL-MCNC: 8.9 MG/DL (ref 8.5–10.1)
CHLORIDE SERPL-SCNC: 108 MMOL/L (ref 99–107)
CO2 SERPL-SCNC: 26.7 MMOL/L (ref 24–32)
CREAT CL PREDICTED SERPL C-G-VRATE: 66 ML/MIN
CREAT SERPL-MCNC: 0.75 MG/DL (ref 0.6–1.1)
EOSINOPHIL # BLD AUTO: (no result) X10'3 (ref 0–0.9)
EOSINOPHIL NFR BLD AUTO: (no result) % (ref 0–5)
EOSINOPHIL NFR BLD MANUAL: 2 % (ref 0–6)
ERYTHROCYTE [DISTWIDTH] IN BLOOD BY AUTOMATED COUNT: 14.7 % (ref 11–16)
GFR SERPL CREATININE-BSD FRML MDRD: > 90 ML/MIN
GLOBULIN SER CALC-MCNC: 2.8 G/DL (ref 2.7–4.3)
GLUCOSE SERPL-MCNC: 86 MG/DL (ref 70–104)
HCT VFR BLD AUTO: 38.5 % (ref 43.5–53.7)
HGB BLD-MCNC: 12.9 G/DL (ref 12.5–16.3)
INR PPP: 1.1 INR
LYMPHOCYTES # BLD AUTO: (no result) X10'3 (ref 0.6–4.1)
LYMPHOCYTES NFR BLD AUTO: (no result) % (ref 24–44)
LYMPHOCYTES NFR BLD MANUAL: 26 % (ref 21–51)
MCH RBC QN AUTO: 32.1 PG (ref 27–31.2)
MCHC RBC AUTO-ENTMCNC: 33.6 % (ref 32–36)
MCV RBC AUTO: 95.7 FL (ref 81–97)
MONOCYTES # BLD AUTO: (no result) X10'3 (ref 0–0.9)
MONOCYTES NFR BLD MANUAL: (no result) % (ref 0–12)
MONOCYTES NFR BLD MANUAL: 8 % (ref 2–12)
NEUTROPHILS # BLD AUTO: (no result) X10'3 (ref 1.4–?)
NEUTS SEG NFR BLD MANUAL: (no result) % (ref 36–66)
NEUTS SEG NFR BLD MANUAL: 63 % (ref 42–75)
PLATELET # BLD AUTO: 125 X10'3 (ref 130–400)
PLATELET BLD QL SMEAR: (no result)
PMV BLD: (no result) FL (ref 7.4–11.4)
POTASSIUM SERPL-SCNC: 3.7 MMOL/L (ref 3.5–5.1)
PROT SERPL-MCNC: 5.8 G/DL (ref 6.4–8.2)
PROTHROMBIN TIME: 11.4 SECONDS (ref 9–12)
RBC # BLD AUTO: 4.02 X10'6 (ref 4.3–5.9)
RBC MORPH BLD: NORMAL
SODIUM SERPL-SCNC: 141 MMOL/L (ref 135–145)
TOTAL CELLS COUNTED FLD: 100
WBC # BLD AUTO: 4.7 X10'3 (ref 4.5–11)